# Patient Record
Sex: MALE | Race: WHITE | NOT HISPANIC OR LATINO | URBAN - METROPOLITAN AREA
[De-identification: names, ages, dates, MRNs, and addresses within clinical notes are randomized per-mention and may not be internally consistent; named-entity substitution may affect disease eponyms.]

---
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
## 2021-05-27 NOTE — ED PEDIATRIC NURSE NOTE - FALLEN IN THE PAST
Last vitals:   Vitals:    03/27/19 1700   BP: 168/89   Pulse: 86   Resp: 22   Temp: 37.5  C (99.5  F)   SpO2: 100%     Patient spontaneous RR, TV 400s, suctioned, following commands, extubated to facemask 10LPM, O2 sats 100%. VSS. Report to RN.    Patient's level of consciousness is drowsy  Spontaneous respirations: yes  Maintains airway independently: yes  Dentition unchanged: yes  Oropharynx: oropharynx clear of all foreign objects    QCDR Measures:  ASA# 20 - Surgical Safety Checklist: WHO surgical safety checklist completed prior to induction    PQRS# 430 - Adult PONV Prevention: 4558F - Pt received => 2 anti-emetic agents (different classes) preop & intraop  ASA# 8 - Peds PONV Prevention: NA - Not pediatric patient, not GA or 2 or more risk factors NOT present  PQRS# 424 - Leanne-op Temp Management: 4559F - At least one body temp DOCUMENTED => 35.5C or 95.9F within required timeframe  PQRS# 426 - PACU Transfer Protocol: - Transfer of care checklist used  ASA# 14 - Acute Post-op Pain: ASA14B - Patient did NOT experience pain >= 7 out of 10   no

## 2021-09-03 NOTE — ED BEHAVIORAL HEALTH ASSESSMENT NOTE - NS ED BHA HEROIN OPIATES
9/24/2021                   Esvin Aguilera  [address] [facility] [address]      Dear Esvin,    We are writing to inform you that you are due for your annual Medicare Wellness Exam.    This appointment will focus on preventive care including a Personalized Prevention Plan, a check of your weight, blood pressure and pulse, a brief check of your hearing and vision, any immunizations you may need, as well as referrals for any other screening services or test you may need.    Medicare will cover the cost of the preventive care services.  Any additional services including lab and some immunizations may not be a covered service.  If you have any other health concerns or questions to discuss with your provider, a medical visit may be added to your wellness visit and you may need to pay a deductible or co-pay depending on your Medicare plan.    Please call our office at [phone] to schedule your appointment with Titus Carrillo MD .    We look forward to seeing you soon.    Titus Carrillo MD  [address] [facility] [address]  [phone]   0 None known

## 2023-01-12 NOTE — ED BEHAVIORAL HEALTH ASSESSMENT NOTE - PRIVATE RESIDENCE
[place] Note Text (......Xxx Chief Complaint.): This diagnosis correlates with the Render Risk Assessment In Note?: no Detail Level: Simple Other (Free Text): Presence of comedones in the groin is suspicious for H.S., we will discuss this at the next visit.

## 2023-09-05 NOTE — ED PROVIDER NOTE - CHPI ED SYMPTOMS POS
Caller: AZRA LAGUNA    Relationship: Emergency Contact    Best call back number: [phone]     Requested Prescriptions:   Requested Prescriptions     Pending Prescriptions Disp Refills    levothyroxine (SYNTHROID, LEVOTHROID) 50 MCG tablet 90 tablet 0     Sig: Take 1 tablet by mouth Every Morning.        Pharmacy where request should be sent: [facility] - [facility] [address] - [phone]  - [phone] FX     Last office visit with prescribing clinician: 6/21/2023   Last telemedicine visit with prescribing clinician: Visit date not found   Next office visit with prescribing clinician: Visit date not found     Additional details provided by patient:   PATIENT'S (DAUGHTER) AZRA STATED THAT [facility] DID NOT HAVE PATIENT'S PCP UPDATED AS DARYL MEDINA DO AND WAS UNABLE TO FILL THIS MEDICATION WHEN PRESCRIPTION WAS SENT OVER AND NOW SINCE PATIENT HAS UPDATED THE PCP IN THE [facility] SYSTEM THEY ARE NEEDING A NEW PRESCRIPTION TO BE SENT OVER FOR A 90 DAY SUPPLIES TO BE ABLE TO FILL MEDICATION FOR PATIENT       Does the patient have less than a 3 day supply:  [x] Yes  [] No    Would you like a call back once the refill request has been completed: [] Yes [x] No    If the office needs to give you a call back, can they leave a voicemail: [] Yes [x] No    Brittaney Dougherty Rep   09/05/23 13:15 EDT            DEPRESSION

## 2023-10-03 NOTE — ED BEHAVIORAL HEALTH ASSESSMENT NOTE - ACCESS TO FIREARM
(Computer) last seen in office 5-24, lipid same day chol. 132, HDL 62, LDL 46, triglycerides 118  
No
